# Patient Record
Sex: MALE | Race: WHITE | Employment: FULL TIME | URBAN - METROPOLITAN AREA
[De-identification: names, ages, dates, MRNs, and addresses within clinical notes are randomized per-mention and may not be internally consistent; named-entity substitution may affect disease eponyms.]

---

## 2023-02-09 ENCOUNTER — OFFICE VISIT (OUTPATIENT)
Dept: PRIMARY CARE CLINIC | Age: 28
End: 2023-02-09
Payer: COMMERCIAL

## 2023-02-09 VITALS
TEMPERATURE: 98.7 F | SYSTOLIC BLOOD PRESSURE: 142 MMHG | HEART RATE: 86 BPM | OXYGEN SATURATION: 98 % | DIASTOLIC BLOOD PRESSURE: 94 MMHG | WEIGHT: 221.6 LBS | HEIGHT: 72 IN | BODY MASS INDEX: 30.02 KG/M2

## 2023-02-09 DIAGNOSIS — T14.8XXA ABRASION: Primary | ICD-10-CM

## 2023-02-09 DIAGNOSIS — K64.4 RESIDUAL HEMORRHOIDAL SKIN TAGS: ICD-10-CM

## 2023-02-09 PROCEDURE — 99203 OFFICE O/P NEW LOW 30 MIN: CPT | Performed by: NURSE PRACTITIONER

## 2023-02-09 SDOH — ECONOMIC STABILITY: INCOME INSECURITY: HOW HARD IS IT FOR YOU TO PAY FOR THE VERY BASICS LIKE FOOD, HOUSING, MEDICAL CARE, AND HEATING?: NOT HARD AT ALL

## 2023-02-09 SDOH — ECONOMIC STABILITY: HOUSING INSECURITY
IN THE LAST 12 MONTHS, WAS THERE A TIME WHEN YOU DID NOT HAVE A STEADY PLACE TO SLEEP OR SLEPT IN A SHELTER (INCLUDING NOW)?: NO

## 2023-02-09 SDOH — HEALTH STABILITY: PHYSICAL HEALTH: ON AVERAGE, HOW MANY MINUTES DO YOU ENGAGE IN EXERCISE AT THIS LEVEL?: 50 MIN

## 2023-02-09 SDOH — HEALTH STABILITY: PHYSICAL HEALTH: ON AVERAGE, HOW MANY DAYS PER WEEK DO YOU ENGAGE IN MODERATE TO STRENUOUS EXERCISE (LIKE A BRISK WALK)?: 6 DAYS

## 2023-02-09 SDOH — ECONOMIC STABILITY: FOOD INSECURITY: WITHIN THE PAST 12 MONTHS, THE FOOD YOU BOUGHT JUST DIDN'T LAST AND YOU DIDN'T HAVE MONEY TO GET MORE.: NEVER TRUE

## 2023-02-09 SDOH — ECONOMIC STABILITY: FOOD INSECURITY: WITHIN THE PAST 12 MONTHS, YOU WORRIED THAT YOUR FOOD WOULD RUN OUT BEFORE YOU GOT MONEY TO BUY MORE.: NEVER TRUE

## 2023-02-09 ASSESSMENT — PATIENT HEALTH QUESTIONNAIRE - PHQ9
1. LITTLE INTEREST OR PLEASURE IN DOING THINGS: 0
SUM OF ALL RESPONSES TO PHQ QUESTIONS 1-9: 0
SUM OF ALL RESPONSES TO PHQ QUESTIONS 1-9: 0
SUM OF ALL RESPONSES TO PHQ9 QUESTIONS 1 & 2: 0
SUM OF ALL RESPONSES TO PHQ QUESTIONS 1-9: 0
SUM OF ALL RESPONSES TO PHQ QUESTIONS 1-9: 0
2. FEELING DOWN, DEPRESSED OR HOPELESS: 0

## 2023-02-09 ASSESSMENT — SOCIAL DETERMINANTS OF HEALTH (SDOH)

## 2023-02-09 ASSESSMENT — ENCOUNTER SYMPTOMS
BLOOD IN STOOL: 0
RECTAL PAIN: 1
VOMITING: 0
NAUSEA: 0

## 2023-02-09 NOTE — PROGRESS NOTES
PROGRESS NOTE  Date of Service:  2/9/2023  Address: [address], [address]  Dept: [phone]  Loc: [phone]    Subjective:      Patient ID: [id number]  Eric Aragon is a 28 y.o. male    HPI: patient is a  he has 3and 11year old. He is  as well. Patient is having rectal itching and burning, patient was given hemorrhoid cream. He has been having abnormal hard bowels. Patient does not have a great diet, he said he did have a painful bowel movement. Patient denies blood in his stool. Patient said the cream did help. Patient said Monday he started with itching and burning which was tolerable. Patient said then Monday night he was up a lot with pain and itching. Patient jobs are physical. Patient said he is not exercising regularly outside of work. Review of Systems   Constitutional:  Negative for chills, fatigue and fever. Gastrointestinal:  Positive for rectal pain. Negative for blood in stool, nausea and vomiting. All other systems reviewed and are negative. Objective:   Physical Exam  Vitals reviewed. Exam conducted with a chaperone present. Constitutional:       Appearance: Normal appearance. Cardiovascular:      Rate and Rhythm: Normal rate and regular rhythm. Pulses: Normal pulses. Heart sounds: Normal heart sounds. Pulmonary:      Effort: Pulmonary effort is normal.      Breath sounds: Normal breath sounds. Genitourinary:     Rectum: External hemorrhoid present. Skin:     Capillary Refill: Capillary refill takes less than 2 seconds. Neurological:      General: No focal deficit present. Mental Status: He is alert and oriented to person, place, and time. Psychiatric:         Mood and Affect: Mood normal.         Behavior: Behavior normal.         Thought Content: Thought content normal.         Judgment: Judgment normal.       Plan:   1.  Abrasion we will try Silvadene cream for his abrasion. Patient educated if symptoms do not improve will refer patient to colorectal.  -     silver sulfADIAZINE (SILVADENE) 1 % cream; Apply topically daily. , Disp-50 g, R-1, Normal  2. Residual hemorrhoidal skin tags patient's has hemorrhoids will continue over-the-counter hemorrhoid which is helping educated patient to start MiraLAX to keep bowels soft make sure he is drinking plenty water and increasing fiber. Electronically signed by NIKKI Livingston CNP on 2/9/23 at 2:35 PM EST     This dictation was generated by voice recognition computer software. Although all attempts are made to edit the dictation for accuracy, there may be errors in the transcription that were not intended.

## 2023-02-20 ENCOUNTER — OFFICE VISIT (OUTPATIENT)
Dept: PRIMARY CARE CLINIC | Age: 28
End: 2023-02-20

## 2023-02-20 VITALS
TEMPERATURE: 98.7 F | HEART RATE: 85 BPM | SYSTOLIC BLOOD PRESSURE: 136 MMHG | DIASTOLIC BLOOD PRESSURE: 86 MMHG | BODY MASS INDEX: 30.05 KG/M2 | WEIGHT: 220 LBS | OXYGEN SATURATION: 97 %

## 2023-02-20 DIAGNOSIS — L98.9 SKIN LESION: ICD-10-CM

## 2023-02-20 DIAGNOSIS — J02.9 SORE THROAT: ICD-10-CM

## 2023-02-20 DIAGNOSIS — R21 SKIN RASH: Primary | ICD-10-CM

## 2023-02-20 LAB — S PYO AG THROAT QL: POSITIVE

## 2023-02-20 RX ORDER — CEPHALEXIN 500 MG/1
500 CAPSULE ORAL 2 TIMES DAILY
Qty: 14 CAPSULE | Refills: 0 | Status: SHIPPED | OUTPATIENT
Start: 2023-02-20 | End: 2023-02-27

## 2023-02-20 ASSESSMENT — ENCOUNTER SYMPTOMS: SORE THROAT: 1

## 2023-02-20 NOTE — PROGRESS NOTES
PROGRESS NOTE  Date of Service:  2/20/2023  Address: [facility]  [address]  [facility] [address]  Dept: [phone]  Loc: [phone]    Subjective:      Patient ID: [id number]  Jalen Ivey is a 27 y.o. male    HPI: patient is here for rash on face. Patient said he was on his way to work on Tuesday he felt his face was itching, patient said that day it was worse. Patient said that he did have older razor.     Patient said he did have a night where he did sweat through his cloths.     Review of Systems   Constitutional:  Positive for chills and fatigue.   HENT:  Positive for sore throat.    Skin:  Positive for rash.   All other systems reviewed and are negative.  Objective:   Physical Exam  Vitals reviewed.   Constitutional:       Appearance: Normal appearance. He is well-developed.   HENT:      Head: Normocephalic and atraumatic.      Right Ear: Ear canal and external ear normal.      Left Ear: Ear canal and external ear normal.      Nose: Nose normal.      Mouth/Throat:      Pharynx: Uvula midline. Pharyngeal swelling and posterior oropharyngeal erythema present. No oropharyngeal exudate.   Cardiovascular:      Rate and Rhythm: Normal rate and regular rhythm.      Pulses: Normal pulses.      Heart sounds: Normal heart sounds. No murmur heard.  Pulmonary:      Effort: Pulmonary effort is normal.      Breath sounds: Normal breath sounds. No wheezing or rales.   Skin:     General: Skin is warm and dry.      Capillary Refill: Capillary refill takes less than 2 seconds.      Findings: Rash present. Rash is crusting.   Neurological:      General: No focal deficit present.      Mental Status: He is alert and oriented to person, place, and time.   Psychiatric:         Mood and Affect: Mood normal.         Behavior: Behavior normal.         Thought Content: Thought content normal.         Judgment: Judgment normal.       Plan:   1. Skin rash suspect  patient skin rash related to strep. We will put patient on antibiotics patient educated if symptoms do not improve over the next 24 to 48 hours to call office. Patient agrees with plan. -     cephALEXin (KEFLEX) 500 MG capsule; Take 1 capsule by mouth 2 times daily for 7 days, Disp-14 capsule, R-0Normal  -     POCT rapid strep A  2. Sore throat patient did have a positive strep in the office. We will treat with antibiotics. -     cephALEXin (KEFLEX) 500 MG capsule; Take 1 capsule by mouth 2 times daily for 7 days, Disp-14 capsule, R-0Normal  -     POCT rapid strep A  3. Skin lesion patient does have a mole on his left upper chest will refer to dermatology for evaluation.  -     Shay Rebollar MD, Dermatology, [facility]           Electronically signed by NIKKI Granados CNP on 2/20/23 at 4:28 PM EST     This dictation was generated by voice recognition computer software. Although all attempts are made to edit the dictation for accuracy, there may be errors in the transcription that were not intended.

## 2023-03-22 DIAGNOSIS — K92.1 BLOOD IN STOOL: Primary | ICD-10-CM

## 2023-04-03 ENCOUNTER — OFFICE VISIT (OUTPATIENT)
Dept: SURGERY | Age: 28
End: 2023-04-03
Payer: COMMERCIAL

## 2023-04-03 VITALS
SYSTOLIC BLOOD PRESSURE: 149 MMHG | HEART RATE: 75 BPM | HEIGHT: 72 IN | BODY MASS INDEX: 31.02 KG/M2 | RESPIRATION RATE: 16 BRPM | DIASTOLIC BLOOD PRESSURE: 99 MMHG | OXYGEN SATURATION: 99 % | WEIGHT: 229 LBS | TEMPERATURE: 98.1 F

## 2023-04-03 DIAGNOSIS — K64.9 HEMORRHOIDS, UNSPECIFIED HEMORRHOID TYPE: Primary | ICD-10-CM

## 2023-04-03 PROCEDURE — 99203 OFFICE O/P NEW LOW 30 MIN: CPT | Performed by: SURGERY

## 2023-04-03 RX ORDER — PSYLLIUM HUSK (WITH SUGAR) 3 G/12 G
1 POWDER (GRAM) ORAL PRN
COMMUNITY

## 2023-04-03 NOTE — PROGRESS NOTES
Hemorrhoids    Plan:     Discussed RBA of various treatment options. At this point I recommend the patient supplement the diet with some fiber and avoid straining. Discussed toilet habits and recommended to not spend long time sitting on commode. Discussed the hemorrhoid banding procedure.  Advised lifestyle changes for 4-6 weeks and if still noticing the bleeding or if worsens consider hemorrhoid banding    La Whitney M.D.  4/3/23   9:22 AM

## 2023-04-03 NOTE — Clinical Note
Julio C Joseph,  Thanks for sending Mr. Reji Segura.  Discussed hemorrhoids with him today  [facility]